# Patient Record
Sex: FEMALE | Race: WHITE | HISPANIC OR LATINO | Employment: FULL TIME | URBAN - METROPOLITAN AREA
[De-identification: names, ages, dates, MRNs, and addresses within clinical notes are randomized per-mention and may not be internally consistent; named-entity substitution may affect disease eponyms.]

---

## 2017-01-10 ENCOUNTER — ALLSCRIPTS OFFICE VISIT (OUTPATIENT)
Dept: OTHER | Facility: OTHER | Age: 27
End: 2017-01-10

## 2017-01-10 LAB — HCG, QUALITATIVE (HISTORICAL): NEGATIVE

## 2017-02-07 ENCOUNTER — ALLSCRIPTS OFFICE VISIT (OUTPATIENT)
Dept: OTHER | Facility: OTHER | Age: 27
End: 2017-02-07

## 2017-04-18 ENCOUNTER — LAB REQUISITION (OUTPATIENT)
Dept: LAB | Facility: HOSPITAL | Age: 27
End: 2017-04-18
Payer: COMMERCIAL

## 2017-04-18 ENCOUNTER — ALLSCRIPTS OFFICE VISIT (OUTPATIENT)
Dept: OTHER | Facility: OTHER | Age: 27
End: 2017-04-18

## 2017-04-18 DIAGNOSIS — Z01.419 ENCOUNTER FOR GYNECOLOGICAL EXAMINATION WITHOUT ABNORMAL FINDING: ICD-10-CM

## 2017-04-18 PROCEDURE — G0145 SCR C/V CYTO,THINLAYER,RESCR: HCPCS | Performed by: OBSTETRICS & GYNECOLOGY

## 2017-04-18 PROCEDURE — 87624 HPV HI-RISK TYP POOLED RSLT: CPT | Performed by: OBSTETRICS & GYNECOLOGY

## 2017-04-21 LAB — HPV RRNA GENITAL QL NAA+PROBE: ABNORMAL

## 2017-04-26 ENCOUNTER — GENERIC CONVERSION - ENCOUNTER (OUTPATIENT)
Dept: OTHER | Facility: OTHER | Age: 27
End: 2017-04-26

## 2017-04-26 LAB
LAB AP GYN PRIMARY INTERPRETATION: NORMAL
Lab: NORMAL
PATH INTERP SPEC-IMP: NORMAL

## 2017-05-04 ENCOUNTER — LAB REQUISITION (OUTPATIENT)
Dept: LAB | Facility: HOSPITAL | Age: 27
End: 2017-05-04
Payer: COMMERCIAL

## 2017-05-04 ENCOUNTER — ALLSCRIPTS OFFICE VISIT (OUTPATIENT)
Dept: OTHER | Facility: OTHER | Age: 27
End: 2017-05-04

## 2017-05-04 DIAGNOSIS — R87.610 ATYPICAL SQUAMOUS CELLS OF UNDETERMINED SIGNIFICANCE ON CYTOLOGIC SMEAR OF CERVIX (ASC-US): ICD-10-CM

## 2017-05-04 PROCEDURE — 88305 TISSUE EXAM BY PATHOLOGIST: CPT | Performed by: OBSTETRICS & GYNECOLOGY

## 2017-05-23 ENCOUNTER — GENERIC CONVERSION - ENCOUNTER (OUTPATIENT)
Dept: OTHER | Facility: OTHER | Age: 27
End: 2017-05-23

## 2018-01-09 NOTE — RESULT NOTES
Verified Results  (1) THIN PREP PAP WITH IMAGING [id number] 01:12PM Zacarias Monae Order Number: [id number]     Test Name Result Flag Reference   LAB AP CASE REPORT (Report)     Gynecologic Cytology Report            Case: [id number]                  Authorizing Provider: Andre Velazquez MD  Collected:      04/18/2017 1312        First Screen:     Talbert Sicard, CT   Received:      04/20/2017 1202        Pathologist:      Pierre Hall MD                               Specimen:  LIQUID-BASED PAP, SCREENING, Endocervical   HPV HIGH RISK RESULT (Report)     HPV, High Risk: HPV POS, HPV16 NEG, HPV18 NEG      Other High Risk HPV Positive, HPV 16 Negative, HPV 18 Negative  Specimen is positive for the DNA of any one of, or combination of the following high risk HPV types: 02,29,23,67,25,85,35,31,13,08,17,99  HPV types 16 and 18 DNA were undetectable or below the pre-set threshold  [employer]???s FDA approved Crystal 4800 is utilized with strict adherence to the ???s instruction  manual to test for the presence of High-Risk HPV DNA, as well as HPV 16 and HPV 18  This instrument  has been validated by our laboratory and/or by the   A negative result does not preclude the presence of HPV infection because results depend on adequate  specimen collection, absence of inhibitors and sufficient DNA to be detected  Additionally, HPV negative  results are not intended to prevent women from proceeding to colposcopy if clinically warranted  Positive HPV test results indicate the presence of any one or more of the high risk types, but since patients  are often co-infected with low-risk types it does not rule out the presence of low-risk types in patients  with mixed infections     LAB AP GYN PRIMARY INTERPRETATION      Epithelial cell abnormality  Electronically signed by Pierre Hall MD on 4/26/2017 at 3:16 PM   LAB AP GYN INTERPRETATION      Atypical squamous cells of undetermined significance, cannot exclude HSIL   LAB AP GYN SPECIMEN ADEQUACY      Satisfactory for evaluation  Endocervical/transformation zone component present  LAB AP GYN ADDITIONAL INFORMATION (Report)     [employer]'s FDA approved ,  and ThinPrep Imaging System are   utilized with strict adherence to the 's instruction manual to   prepare gynecologic and non-gynecologic cytology specimens for the   production of ThinPrep slides as well as for gynecologic ThinPrep imaging  These processes have been validated by our laboratory and/or by the     The Pap test is not a diagnostic procedure and should not be used as the   sole means to detect cervical cancer  It is only a screening procedure to   aid in the detection of cervical cancer and its precursors  Both   false-negative and false-positive results have been experienced  Your   patient's test result should be interpreted in this context together with   the history and clinical findings  Interpretation performed at [place] [address]   LAB AP CASE REPORT (Report)     Gynecologic Cytology Report            Case: [id number]                  Authorizing Provider: Brandin Nielsen MD  Collected:      04/18/2017 1312        First Screen:     DEANDRA Martinez   Received:      04/20/2017 1202        Pathologist:      Aurea Grace MD                               Specimen:  LIQUID-BASED PAP, SCREENING, Endocervical   HPV HIGH RISK RESULT (Report)     HPV, High Risk: HPV POS, HPV16 NEG, HPV18 NEG      Other High Risk HPV Positive, HPV 16 Negative, HPV 18 Negative  Specimen is positive for the DNA of any one of, or combination of the following high risk HPV types: 91,48,68,75,69,15,61,63,31,50,64,08  HPV types 16 and 18 DNA were undetectable or below the pre-set threshold    [employer]???s FDA approved Crystal 4800 is utilized with strict adherence to the ???s instruction  manual to test for the presence of High-Risk HPV DNA, as well as HPV 16 and HPV 18  This instrument  has been validated by our laboratory and/or by the   A negative result does not preclude the presence of HPV infection because results depend on adequate  specimen collection, absence of inhibitors and sufficient DNA to be detected  Additionally, HPV negative  results are not intended to prevent women from proceeding to colposcopy if clinically warranted  Positive HPV test results indicate the presence of any one or more of the high risk types, but since patients  are often co-infected with low-risk types it does not rule out the presence of low-risk types in patients  with mixed infections  LAB AP GYN PRIMARY INTERPRETATION      Epithelial cell abnormality  Electronically signed by Vahid Hodge MD on 4/26/2017 at 3:16 PM   LAB AP GYN INTERPRETATION      Atypical squamous cells of undetermined significance, cannot exclude HSIL   LAB AP GYN SPECIMEN ADEQUACY      Satisfactory for evaluation  Endocervical/transformation zone component present  LAB AP GYN ADDITIONAL INFORMATION (Report)     [employer]'s FDA approved ,  and ThinPrep Imaging System are   utilized with strict adherence to the 's instruction manual to   prepare gynecologic and non-gynecologic cytology specimens for the   production of ThinPrep slides as well as for gynecologic ThinPrep imaging  These processes have been validated by our laboratory and/or by the     The Pap test is not a diagnostic procedure and should not be used as the   sole means to detect cervical cancer  It is only a screening procedure to   aid in the detection of cervical cancer and its precursors  Both   false-negative and false-positive results have been experienced  Your   patient's test result should be interpreted in this context together with   the history and clinical findings    Interpretation performed at [place] [address] [facility]   [id number]

## 2018-01-10 NOTE — PROGRESS NOTES
AUG 15 2016         RE: Di Phlegm                               To: [phone] [facility] [address]   [facility]   MR#: [id number]                                    [facility]   : [address] [place] [address] Road   ENC: [id number]                             [place], [facility]   (Exam #: [id number])                           Fax: [phone]      The LMP of this 26year old,  G1, P0-0-0-0 patient was 2016, giving   her an KALEE of 2016 and a current gestational age of 35 weeks 0 days   by dates  A sonographic examination was performed on AUG 15 2016 using   real time equipment  The patient has a BMI of 26 2  Her blood pressure   today was 119/72  Earliest ultrasound found in her record: 16 10w1d 16 KALEE         Problem list   1  History of a choroid plexus cyst seen on her 20 week ultrasound  NIPT   was normal       Cardiac motion was observed at 138 bpm       INDICATIONS      choroid plexus cyst   Evaluate missed anatomy      Exam Types      Level I      RESULTS      Fetus # 1 of 1   Vertex presentation   Fetal growth appeared normal   Placenta Location = Posterior   No placenta previa   Placenta Grade = I      MEASUREMENTS (* Included In Average GA)      AC              23 7 cm        27 weeks 6 days* (45%)   BPD              7 1 cm        28 weeks 4 days* (50%)   HC              25 4 cm        27 weeks 2 days* (22%)   Femur            5 6 cm        29 weeks 3 days* (64%)      Cerebellum       3 2 cm        28 weeks 5 days      HC/AC           1 07   FL/AC           0 24   FL/BPD          0 79   Ceph Index      0 82   EFW (Ac/Fl/Hc)  1221 grams - 2 lbs 11 oz                 (53%)      THE AVERAGE GESTATIONAL AGE is 28 weeks 2 days +/- 14 days        AMNIOTIC FLUID      Q1: 3 9      Q2: 3 1      Q3: 1 9      Q4: 2 5   FLOR Total = 11 4 cm   Amniotic Fluid: Normal      ANATOMY DETAILS      Visualized Appearing Sonographically Normal:   HEAD: (Calvarium, BPD Level, Cavum, Lateral Ventricles, Choroid Plexus,   Cerebellum, Cisterna Magna);    TH  CAV : (Diaphragm); HEART: (Four   Chamber View, Proximal Left Outflow, Proximal Right Outflow, 3 Vessel   Trachea, Short Axis of Greater Vessels, Ductal Arch, Aortic Arch,   Interventricular Septum, Interatrial Septum, IVC, SVC, Cardiac Axis,   Cardiac Position);    ABD  CAV , STOMACH, RIGHT KIDNEY, LEFT KIDNEY,   BLADDER, GENITALIA (Female), PLACENTA      ANATOMY COMMENTS      The prior fetal anatomic survey was limited in the area of the cardiac   septum, short axis views, spine, cavum, and profile  These anatomic views   were seen today as sonographically normal within the inherent limitations   of fetal ultrasound  The isolated choroid plexus cyst seen on her prior   scan has now resolved  IMPRESSION      Judd IUP   28 weeks and 2 days by this ultrasound  (KALEE=NOV 5 2016)   Vertex presentation   Fetal growth appeared normal   Regular fetal heart rate of 138 bpm   Posterior placenta   No placenta previa      GENERAL COMMENT      The patient was informed of the findings and counseled about the   limitations of the exam in detecting all forms of fetal congenital   abnormalities  She denies any vaginal bleeding or uterine cramping/contractions  She does   feel fetal movement  Exam shows she is comfortable and her abdomen is non tender  The patient and the father the baby had questions today and were counseled   that an association between choroid plexus cysts and chromosomal   abnormalities, especially trisomy 25, exists  This usually occurs when   associated anomalies are seen  In the absence of a fetal structural   malformation, early IUGR, polyhydramnios, or other marker for aneuploidy,    the risk for trisomy 18 is small, likely 1% or less, and likely does not   warrant invasive prenatal diagnosis by amniocentesis     The patient was   also advised that ultrasound cannot detect all fetal anomalies  Cell free   DNA screening was normal indicating a very low ( <1/1,000) risk for   trisomy 18  Follow up recommended: No US follow-up is recommended at this time  SAJI Hayes M D     Maternal-Fetal Medicine   Electronically signed 08/15/16 12:48           Electronically signed by:Tanya Pizano DO  Aug 22 2856  4:21PM EST

## 2018-01-11 NOTE — RESULT NOTES
Verified Results  (1) THIN PREP PAP FOLLOW UP WITH IMAGING [id number] 70:47JC Driss Steele Order Number: [id number]     Test Name Result Flag Reference   LAB AP CASE REPORT (Report)     Gynecologic Cytology Report            Case: [id number]                  Authorizing Provider: Cj Ambriz DO     Collected:      04/13/2016 1620        First Screen:     Michelle Chacon, CT Received:      04/19/2016 3236        Pathologist:      Taylor Mccall MD                             Specimen:  LIQUID-BASED PAP, DIAGNOSTIC, Cervix   LAB AP GYN PRIMARY INTERPRETATION      Epithelial cell abnormality   LAB AP GYN INTERPRETATION      Low grade squamous intraepithelial lesion   LAB AP GYN SPECIMEN ADEQUACY      Satisfactory for evaluation  Endocervical/transformation zone component present  LAB AP GYN NOTE      Interpretation performed at [facility], [address] [facility]   [address]   LAB AP GYN ADDITIONAL INFORMATION (Report)     [employer]'s FDA approved ,  and ThinPrep Imaging System are   utilized with strict adherence to the 's instruction manual to   prepare gynecologic and non-gynecologic cytology specimens for the   production of ThinPrep slides as well as for gynecologic ThinPrep imaging  These processes have been validated by our laboratory and/or by the     The Pap test is not a diagnostic procedure and should not be used as the   sole means to detect cervical cancer  It is only a screening procedure to   aid in the detection of cervical cancer and its precursors  Both   false-negative and false-positive results have been experienced  Your   patient's test result should be interpreted in this context together with   the history and clinical findings     LAB AP CLINICAL INFORMATION      TW Order Number: [id number]

## 2018-01-12 NOTE — PROGRESS NOTES
2016         RE: Shayla Barillas                               To: [phone] [facility] [address]   Women's Healthcare   MR#: [id number]                                    [facility]   : [address]   ENC: [id number] VIDES, [address] [facility]   (Exam #: [id number])                           Fax: [phone]      The LMP of this 26year old,  G1, P0-0-0-0 patient was 2016, giving   her an KALEE of 2016 and a current gestational age of 25 weeks 0 days   by dates  A sonographic examination was performed on 2016 using   real time equipment  The ultrasound examination was performed using   abdominal & vaginal techniques  The patient has a BMI of 22 6  Her blood   pressure today was 111/87  Earliest ultrasound found in her record: 16 10w1d 16 KALEE      Thank you very much for referring this very nice patient for a    consultation and fetal anatomic survey  This is the patient's first   pregnancy  She has a medical history of occasional back pain after she   had an accident in   She has occasional depression but does not   require the use of medications  She denies the current use of tobacco,   alcohol, or drugs  Her medications include prenatal vitamins and she has   no significant drug allergies  She has a brother who was born   significantly premature and has developmental delay secondary to   prematurity  The father the baby has a sister with petit mal seizures  No other family members have significant congenital birth defects  A   review of systems is otherwise negative  On exam, the patient appears   well, in no acute distress, and her abdomen is nontender        Cardiac motion was observed at 148 bpm       INDICATIONS      fetal anatomical survey      Exam Types      LEVEL II   Transvaginal      RESULTS      Fetus # 1 of 1   Vertex presentation   Fetal growth appeared normal   Placenta Location = Posterior   No placenta previa   Placenta Grade = I      MEASUREMENTS (* Included In Average GA)      AC              14 3 cm        19 weeks 2 days* (38%)   BPD              4 5 cm        19 weeks 4 days* (42%)   HC              17 1 cm        19 weeks 4 days* (37%)   Femur            3 1 cm        19 weeks 5 days* (33%)      Nuchal Fold      4 2 mm      Humerus          3 0 cm        20 weeks 0 days  (54%)   Radius           2 6 cm        20 weeks 4 days   Ulna             2 9 cm        20 weeks 4 days   Tibia            2 7 cm        19 weeks 6 days  (38%)   Fibula           2 7 cm        19 weeks 1 day   Foot             3 3 cm        20 weeks 1 day      Cerebellum       2 0 cm        19 weeks 6 days   Biorbit          3 2 cm        20 weeks 5 days   CisternaMagna    5 4 mm      HC/AC           1 20   FL/AC           0 22   FL/BPD          0 68   EFW (Ac/Fl/Hc)   300 grams - 0 lbs 11 oz      THE AVERAGE GESTATIONAL AGE is 19 weeks 4 days +/- 10 days  AMNIOTIC FLUID         Largest Vertical Pocket = 4 2 cm   Amniotic Fluid: Normal      UTERINE ARTERIES                                  S/D   PI    RI    NOTCH       Left Uterine Artery        1 86  0 67  0 46       Right Uterine Artery       2 62  1 09  0 62      CERVICAL EVALUATION      The cervix appeared normal (Ultrasound Examination)  SUPINE      Cervical Length: 3 39 cm      OTHER TEST RESULTS           Funneling?: No             Dynamic Changes?: No        Resp  To TFP?: No      ANATOMY DETAILS      Visualized Appearing Sonographically Normal:   HEAD: (Calvarium, BPD Level, Lateral Ventricles, Cerebellum, Cisterna   Magna);    FACE/NECK: (Neck, Nuchal Fold, Orbits, Nose/Lips, Palate,   Face);    TH  CAV : (Diaphragm);     HEART: (Four Chamber View, Proximal   Left Outflow, Proximal Right Outflow, 3 Vessel Trachea, Ductal Arch,   Aortic Arch, Interatrial Septum, Cardiac Axis, Cardiac Position);    ABD    CAV , STOMACH, RIGHT KIDNEY, LEFT KIDNEY, BLADDER, ABD  WALL, SPINE:   (Cervical Spine, Thoracic Spine, Lumbar Spine);    EXTREMS: (Lt Humerus,   Rt Humerus, Lt Forearm, Rt Forearm, Lt Hand, Rt Hand, Lt Femur, Rt Femur,   Lt Low Leg, Rt Low Leg, Lt Foot, Rt Foot);    GENITALIA, PLACENTA, UMBL  CORD, UTERUS, PCI      Suboptimally Visualized:   HEAD: (Cavum);    FACE/NECK: (Profile); HEART: (Interventricular   Septum); SPINE: (Sacrum)      Not Visualized:   HEART: (Short Axis of Greater Vessels)      Abnormal:   HEAD: (Choroid Plexus)      ADNEXA      The left ovary appeared normal and measured 2 3 x 1 2 x 1 4 cm with a   volume of 2 0 cc  The right ovary was not visualized  IMPRESSION      Judd IUP   19 weeks and 4 days by this ultrasound  (KALEE=NOV 10 2016)   Vertex presentation   Fetal growth appeared normal   Regular fetal heart rate of 148 bpm   Choroid plexus cyst   Posterior placenta   No placenta previa      GENERAL COMMENT      The patient had previously not had genetic screening  Today's ultrasound is limited by fetal position; therefore, the fetal   anatomic survey could not be completed  An apparently isolated choroid   plexus cyst is appreciated  We discussed that this finding in general was   not associated with aneuploidy in isolation however since we were not able   to complete the fetal anatomic survey, we cannot say that this is truly   isolated  The short axis view of the heart, intraventricular septum,    cavum septum pellucidum, and sacral spine were suboptimally visualized   today  Given that the patient had not previously had genetic screening,   we discussed options for genetic screening which at this point include a   quad screen or noninvasive prenatal screening  We also discussed the   option of diagnostic testing  After our discussion, the patient decided   she desired noninvasive prenatal screening utilizing free fetal DNA    The   patient's insurance requires a prior authorization and therefore this will   be completed over the next week and then the patient can decide whether or   not she wants to undergo this testing at that time  Good fetal movement and tone are seen  The amniotic fluid volume appears   normal   The placenta is posterior and it appears sonographically normal     No notching of either uterine artery waveform is appreciated and resistive   indices are normal   A transvaginal ultrasound was performed to assess the   cervix, which was not seen well transabdominally  The cervical length was   3 39 centimeters, which is normal for the current gestational age  There   was no significant funneling or dynamic changes appreciated  The   limitations of ultrasound were reviewed with the patient, which she   appears to understand and accepts  She was informed of today's findings   and all of her questions were answered  We discussed follow-up in detail and I recommend the patient return in   approximately 8 weeks to complete the anatomic survey and for a fetal   growth ultrasound  Noninvasive prenatal screening will be performed after   the patient's prior authorization  Please note, in addition to the time spent discussing the results of the   ultrasound, I spent approximately 15 minutes of face-to-face time with the   patient, greater than 50% of which was spent in counseling and the   coordination of care for this patient  Thank you very much for allowing us to participate in the care of this   very nice patient  Should you have any questions, please do not hesitate   to contact our office  RECOMMENDATION      Growth Ultrasound: at 28 weeks      SAJI Goode M D  Electronically signed 06/20/16 10:48           Electronically signed by:Liza HARRELL  Jun 21 2016  5:10PM EST Review     Electronically signed by:Liza HARRELL    Jun 21 2016  5:16PM EST Review

## 2018-01-13 VITALS
HEIGHT: 67 IN | WEIGHT: 162.6 LBS | DIASTOLIC BLOOD PRESSURE: 76 MMHG | BODY MASS INDEX: 25.52 KG/M2 | OXYGEN SATURATION: 97 % | HEART RATE: 94 BPM | SYSTOLIC BLOOD PRESSURE: 118 MMHG

## 2018-01-13 VITALS
HEART RATE: 89 BPM | WEIGHT: 141 LBS | SYSTOLIC BLOOD PRESSURE: 126 MMHG | HEIGHT: 67 IN | DIASTOLIC BLOOD PRESSURE: 72 MMHG | BODY MASS INDEX: 22.13 KG/M2

## 2018-01-13 VITALS
BODY MASS INDEX: 22.48 KG/M2 | WEIGHT: 143.25 LBS | DIASTOLIC BLOOD PRESSURE: 68 MMHG | RESPIRATION RATE: 16 BRPM | HEART RATE: 91 BPM | HEIGHT: 67 IN | SYSTOLIC BLOOD PRESSURE: 104 MMHG

## 2018-01-13 NOTE — MISCELLANEOUS
Message  Left message on pt home phone regarding authorization for NIPT testing by [employer]  Authorization number [id number]  from 06/21/2016-08/29/2016  TRF mailed  Instruction given and pt to contact [facility] if questions  Active Problems    1  Fetal size inconsistent with dates (649 60) (O26 849)   2  Hypoglycemia (251 2) (E16 2)   3  Screening for STD (sexually transmitted disease) (V74 5) (Z11 3)   4  Supervision of normal pregnancy (V22 1) (Z34 90)    Current Meds   1  Flintstones Multivitamin CHEW; TAKE TABLET  per pt 2 daily; Therapy: (Recorded:20Jun2016) to Recorded    Allergies    1  Red Dye    2  Dairy   3  Dust   4  Other   5  Pollen   6   Seasonal    Signatures   Electronically signed by : Caesar Baldwin RN; Jun 30 2016  1:57PM EST                       (Author)

## 2018-01-14 VITALS
DIASTOLIC BLOOD PRESSURE: 80 MMHG | SYSTOLIC BLOOD PRESSURE: 118 MMHG | BODY MASS INDEX: 24.51 KG/M2 | HEIGHT: 67 IN | WEIGHT: 156.13 LBS | HEART RATE: 93 BPM | OXYGEN SATURATION: 99 %

## 2018-01-16 NOTE — RESULT NOTES
Verified Results  (1) TISSUE EXAM [id number] 11:09AM Nai He    Order Number: [id number]     Test Name Result Flag Reference   LAB AP CASE REPORT (Report)     Surgical Pathology Report             Case: [id number]                   Authorizing Provider: Edison Rendon MD  Collected:      05/04/2017 1109        Pathologist:      Brenton Zimmer MD       Received:      05/05/2017 3555        Specimen:  Cervix, 12 o'clock   LAB AP FINAL DIAGNOSIS      A  Cervix, 12:00 (biopsy):  - Scant, partially denuded benign squamous mucosa  - Predominately benign endocervical tissue   Electronically signed by Brenton Zimmer MD on 5/8/2017 at 11:58 AM   LAB AP SURGICAL ADDITIONAL INFORMATION (Report)     These tests were developed and their performance characteristics   determined by [facility] or [facility]  They may not be cleared or approved by the [place]  Food and   Drug Administration  The FDA has determined that such clearance or   approval is not necessary  These tests are used for clinical purposes  They should not be regarded as investigational or for research  This   laboratory has been approved by [facility], designated as a high-complexity   laboratory and is qualified to perform these tests  LAB AP GROSS DESCRIPTION (Report)     A  The specimen is received in formalin, labeled with the patient's name   and medical record number, and is designated 12:00 cervical biopsy  The   specimen consists of a tan somewhat friable appearing soft tissue fragment   measuring 1 cm  Entirely submitted  One cassette  Note: The estimated total formalin fixation time based upon information   provided by the submitting clinician and the standard processing schedule   is 33 25 hours      [facility]   LAB AP CLINICAL INFORMATION      TW Order Number: [id number]

## 2018-01-16 NOTE — MISCELLANEOUS
To Whom It May Concern"    Eric Nathan is under my care for her pregnancy, with an anticipated due date of 11/7/2016  For her own health and safety, and for the health of the baby, I am recommending she have at least one  24 hour period per week that she does not work at all  As the pregnancy progresses, she may need to cut her hours even further  Please contact my office with any questions  Sincerely,         Tanya Lozano DO, [place]        Electronically signed by:Tanya Velazquez DO  May 12 9485  4:18PM EST

## 2018-03-07 NOTE — PROGRESS NOTES
Education  [employer] [facility] 3rd Trimester: Third Trimester Education provided: benefits of breastfeeding, importance of exclusive breastfeeding, early initiation of breastfeeding, exclusive breastfeeding for the first 6 months, frequent feedings for optimal milk production, feeding on demand/baby-led feedings, effective positioning and attachment, importance of breastfeeding after 6 months following introduction of other foods, non-pharmacologic pain relief methods for labor and importance of early skin-to-skin contact  rooming-in on 24 hour basis   The patient is planning on breastfeeding  The patient is planning on exclusively breastfeeding until the baby is 10months of age  Thought has been given to selecting a pediatrician  Prenatal education provided by: claudia Date: 10/28/16        Signatures   Electronically signed by : Matilde Davis DO; Oct 28 5500  8:18AM EST                       (Author)